# Patient Record
Sex: MALE | Race: WHITE | URBAN - METROPOLITAN AREA
[De-identification: names, ages, dates, MRNs, and addresses within clinical notes are randomized per-mention and may not be internally consistent; named-entity substitution may affect disease eponyms.]

---

## 2022-02-09 ENCOUNTER — OFFICE VISIT (OUTPATIENT)
Dept: URBAN - METROPOLITAN AREA CLINIC 21 | Facility: CLINIC | Age: 77
End: 2022-02-09
Payer: MEDICARE

## 2022-02-09 DIAGNOSIS — H52.223 REGULAR ASTIGMATISM, BILATERAL: ICD-10-CM

## 2022-02-09 DIAGNOSIS — H04.123 TEAR FILM INSUFFICIENCY OF BILATERAL LACRIMAL GLANDS: ICD-10-CM

## 2022-02-09 DIAGNOSIS — H35.3131 NONEXUDATIVE MACULAR DEGENERATION, EARLY DRY STAGE, BILATERAL: Primary | ICD-10-CM

## 2022-02-09 PROCEDURE — 99204 OFFICE O/P NEW MOD 45 MIN: CPT | Performed by: OPTOMETRIST

## 2022-02-09 ASSESSMENT — VISUAL ACUITY
OD: 20/20
OS: 20/25

## 2022-02-09 ASSESSMENT — INTRAOCULAR PRESSURE
OD: 10
OS: 10

## 2022-02-09 NOTE — IMPRESSION/PLAN
Impression: Nonexudative macular degeneration, early dry stage, bilateral: H35.2221. Plan: AMD, Dry- The patient has dry age-related macular degeneration (AMD). We discussed dry vs wet AMD, and I explained to the patient that currently there is no retinal treatment for dry AMD at this time. I recommend the patient take the AREDS formula anti-oxidant vitamins and to continue weekly self-monitoring for progression with the amsler grid. If there are any changes on the amsler grid, distortion or decreased vision, the patient was encouraged to contact our office immediately.